# Patient Record
Sex: FEMALE | Race: WHITE | NOT HISPANIC OR LATINO | Employment: UNEMPLOYED | URBAN - METROPOLITAN AREA
[De-identification: names, ages, dates, MRNs, and addresses within clinical notes are randomized per-mention and may not be internally consistent; named-entity substitution may affect disease eponyms.]

---

## 2021-02-26 ENCOUNTER — HISTORICAL (OUTPATIENT)
Dept: BARIATRICS | Facility: HOSPITAL | Age: 71
End: 2021-02-26

## 2021-03-25 ENCOUNTER — HISTORICAL (OUTPATIENT)
Dept: BARIATRICS | Facility: HOSPITAL | Age: 71
End: 2021-03-25

## 2021-04-20 ENCOUNTER — HISTORICAL (OUTPATIENT)
Dept: BARIATRICS | Facility: HOSPITAL | Age: 71
End: 2021-04-20

## 2021-05-18 ENCOUNTER — HISTORICAL (OUTPATIENT)
Dept: BARIATRICS | Facility: HOSPITAL | Age: 71
End: 2021-05-18

## 2021-06-09 ENCOUNTER — HISTORICAL (OUTPATIENT)
Dept: ADMINISTRATIVE | Facility: HOSPITAL | Age: 71
End: 2021-06-09

## 2021-06-23 ENCOUNTER — HISTORICAL (OUTPATIENT)
Dept: ADMINISTRATIVE | Facility: HOSPITAL | Age: 71
End: 2021-06-23

## 2021-06-24 LAB — BCS RECOMMENDATION EXT: NORMAL

## 2021-07-27 ENCOUNTER — HISTORICAL (OUTPATIENT)
Dept: BARIATRICS | Facility: HOSPITAL | Age: 71
End: 2021-07-27

## 2021-08-31 ENCOUNTER — HISTORICAL (OUTPATIENT)
Dept: BARIATRICS | Facility: HOSPITAL | Age: 71
End: 2021-08-31

## 2021-09-22 ENCOUNTER — HISTORICAL (OUTPATIENT)
Dept: BARIATRICS | Facility: HOSPITAL | Age: 71
End: 2021-09-22

## 2021-11-12 ENCOUNTER — HISTORICAL (OUTPATIENT)
Dept: BARIATRICS | Facility: HOSPITAL | Age: 71
End: 2021-11-12

## 2022-01-06 ENCOUNTER — HISTORICAL (OUTPATIENT)
Dept: BARIATRICS | Facility: HOSPITAL | Age: 72
End: 2022-01-06

## 2022-03-07 ENCOUNTER — HISTORICAL (OUTPATIENT)
Dept: BARIATRICS | Facility: HOSPITAL | Age: 72
End: 2022-03-07

## 2022-04-30 NOTE — OP NOTE
DATE OF SURGERY:    06/09/2021    SURGEON:  Manish Wright MD    PROCEDURE PERFORMED:  Esophagogastroduodenoscopy and a gastric biopsy.    INDICATION FOR PROCEDURE:  This is a 71-year-old female who is considering having bariatric surgery.  She has occasional reflux.  She is undergoing a comprehensive workup for weight loss surgery.    PREOPERATIVE DIAGNOSES:    1. Morbid obesity.  2. Obstructive sleep apnea.  3. Osteoarthritis.  4. Depression.    POSTOPERATIVE DIAGNOSES:    1. Morbid obesity.  2. Obstructive sleep apnea.  3. Osteoarthritis.  4. Depression.    ANESTHESIA:  MAC.    SPECIMEN REMOVED:  Biopsy of gastric mass versus food bolus.    BLOOD LOSS:  There is no blood loss.    FINDINGS:  There was an 8 x 6 cm fleshy-appearing, very friable mass within the stomach that seemed to be a food bolus.  However, upon carefully examining this mass, which was in the dependent portion of the fundus, it was very friable.  The appearance of it did not look like an ordinary food bolus, but yet did not appear like a regular gastric tumor, either.  The surface was extremely friable, more consistent with food, however, did have the fleshy appearance of very friable gastric mucosa.  For this reason, I did biopsy it.  I did attempt to move it around.  However, the patient began coughing and gagging and we did place in the Trendelenburg and then reverse Trendelenburg position to see if this was a food bolus, it should move to the most dependent portion.  I did seem to move slightly.  However, it was mostly seemed to be located at the fundus of the stomach.  A biopsy was taken and if the biopsy is negative, I will take her back in a week or 2 to repeat the EGD to make sure that this has cleared.    DESCRIPTION OF PROCEDURE:  The patient was brought to the operating room and laid in the left lateral decubitus position.  The MAC was administered by the nurse anesthetist.  The enteroscope was advanced through her mouth, down  her pharynx, down the esophagus, and into the stomach.  Upon retroflexion, there was no hiatal hernia.  The duodenum appeared normal.  Gastric mucosa appeared normal other than the described findings above.  There was an 8 x 6 cm mass that was very difficult to distinguish between a very friable gastric tumor versus a food bolus and biopsies were taken and sent to Pathology.  The stomach was suctioned out.  The scope was withdrawn, brought to postanesthesia care unit in stable satisfactory condition.        ______________________________  Manish Wright MD RA/ALEXANDRA  DD:  06/09/2021  Time:  08:56AM  DT:  06/09/2021  Time:  09:12AM  Job #:  [id number]

## 2022-04-30 NOTE — OP NOTE
DATE OF SURGERY:    06/23/2021    SURGEON:  Manish Wright MD    PROCEDURE PERFORMED:  Esophagogastroduodenoscopy.    INDICATIONS:  This is a 71-year-old female who is being considered for a laparoscopic vertical sleeve gastrectomy for the treatment of her morbid obesity and gastroesophageal reflux disease.  On June 9, 2021, I performed an EGD in preparation for her sleeve gastrectomy.  However, the patient had what ultimately was determined to be a very large food bolus.  The bolus was not normal in appearance, and there was a possibility that there was actually shaggy gastric mucosa from a friable tumor.  Being unable to free this large food bolus from its base and determine with certainty that it was not a tumor of any kind, I took biopsies, which came back as a mixture of mucosal fragments and also vegetable matter.  I wanted to make sure that there was __________ any portion of tumor and have the patient come back with an empty stomach so that I could be certain that there was no intragastric pathology before proceeding with a sleeve gastrectomy.    PREOPERATIVE DIAGNOSES:    1. Morbid obesity.  2. Gastroesophageal reflux disease.  3. Hiatal hernia.    POSTOPERATIVE DIAGNOSES:    1. Morbid obesity.  2. Gastroesophageal reflux disease.  3. Hiatal hernia.    FINDINGS:  There was no intragastric tumor.  The stomach was essentially cleared with no vegetable matter or tumor.    SPECIMENS:  There were no specimens taken.    ESTIMATED BLOOD LOSS:  There was no blood loss.    DESCRIPTION OF PROCEDURE:  Patient was brought to the operating room, laid supine on the operating room table.  Monitored anesthesia was administered after a bite block was placed.  Scope was advanced through the patient's mouth.  The pharynx was briefly examined, as was the esophagus.  The esophagus was free of any masses, and its mucosa appeared normal.  The stomach was fully insufflated, and there was no tumor or intragastric mucosal  abnormalities.  The 1st and 2nd portion of the duodenum were examined as well, and there were no duodenal masses either.  Essentially, she appeared to have a normal EGD, with the exception of a 3-4 cm hiatal hernia.  Patient tolerated the procedure well.  The stomach was de-insufflated, and the scope was withdrawn, and patient was brought to the postanesthesia care unit in stable, satisfactory condition.        ______________________________  Manish Wright MD RA/SHAWN  DD:  06/23/2021  Time:  10:17AM  DT:  06/23/2021  Time:  10:38AM  Job #:  [id number]

## 2022-06-09 ENCOUNTER — TELEPHONE (OUTPATIENT)
Dept: BARIATRICS | Facility: HOSPITAL | Age: 72
End: 2022-06-09
Payer: MEDICARE

## 2022-06-09 NOTE — TELEPHONE ENCOUNTER
Called pt for 6, f/u visit. pt did not answer and does not have a voicemail box set up to leave a message

## 2022-06-09 NOTE — TELEPHONE ENCOUNTER
Patient was attempted to be reached for follow up via phone. However, it says, the voice mailbox has not been setup. Was unable to leave message. Philipas

## 2022-06-29 ENCOUNTER — ANESTHESIA EVENT (OUTPATIENT)
Dept: SURGERY | Facility: HOSPITAL | Age: 72
End: 2022-06-29
Payer: MEDICARE

## 2022-06-30 NOTE — DISCHARGE INSTRUCTIONS
BEFORE THE PROCEDURE:    REPORT ANY CHANGE IN YOUR PHYSICAL CONDITION TO YOUR DOCTOR IMMEDIATELY.  SELF ISOLATE AND CHECK TEMPERATURE DAILY, IF TEMP OVER 100, CALL PHYSICIAN IMMEDIATELY.   NO SMOKING OR ALCOHOL FOR 72 HOURS BEFORE YOUR PROCEDURE.   DO NOT EAT OR DRINK ANYTHING AFTER MIDNIGHT THE NIGHT BEFORE YOUR PROCEDURE    THE MORNING OF YOUR PROCEDURE:      YOU  WILL GET A PHONE CALL THE DAY BEFORE YOUR PROCEDURE WITH YOUR APPOINTED TIME   NO MAKE UP OR NAIL POLISH.   ALL MINORS MUST BE ACCOMPANIED BY AN ADULT AT ALL TIMES.     TAKE A SHOWER/BATH THE NIGHT BEFORE YOUR PROCEDURE.     TAKE BLOOD PRESSURE MEDICATIONS THE MORNING OF YOUR PROCEDURE, WITH SMALL SIPS WATER, AS DIRECTED BY YOUR PHYSICIAN.   DO NOT TAKE ANY DIABETIC MEDICATIONS UNLESS DIRECTED TO DO SO BY YOUR PHYSICIAN.   CONTACT LENSES AND DENTURES MUST BE REMOVED.  A RESPONSIBLE ADULT MUST ACCOMPANY YOU HOME UPON DISCHARGE.   ONLY 1 VISITORS ALLOWED PER ROOM.   BRING YOUR EYE DROPS      COVID 19: RETURN TO FIRST FLOOR REGISTRATION ON Monday July 4, 2022      YOUR THOUGHTS AND OPINIONS HELP US TO BETTER SERVE YOU.     PLEASE PARTICIPATE IN SURVEYS ABOUT YOUR CARE.     THANK YOU FOR CHOOSING [facility].

## 2022-07-01 ENCOUNTER — HOSPITAL ENCOUNTER (OUTPATIENT)
Dept: PREADMISSION TESTING | Facility: HOSPITAL | Age: 72
Discharge: HOME | End: 2022-07-01
Attending: OPHTHALMOLOGY
Payer: MEDICARE

## 2022-07-01 DIAGNOSIS — Z01.818 PRE-OP TESTING: ICD-10-CM

## 2022-07-01 PROBLEM — H25.11 AGE-RELATED NUCLEAR CATARACT, RIGHT EYE: Status: ACTIVE | Noted: 2022-07-01

## 2022-07-01 RX ORDER — ERGOCALCIFEROL 1.25 MG/1
50000 CAPSULE ORAL
COMMUNITY

## 2022-07-01 RX ORDER — AMITRIPTYLINE HYDROCHLORIDE 100 MG/1
100 TABLET ORAL NIGHTLY
COMMUNITY

## 2022-07-01 RX ORDER — CLONAZEPAM 0.5 MG/1
0.5 TABLET ORAL NIGHTLY
COMMUNITY

## 2022-07-01 RX ORDER — SUMATRIPTAN 50 MG/1
50 TABLET, FILM COATED ORAL DAILY PRN
COMMUNITY

## 2022-07-01 RX ORDER — MULTIVIT WITH MINERALS/HERBS
1 TABLET ORAL DAILY
COMMUNITY

## 2022-07-01 RX ORDER — ATENOLOL 50 MG/1
50 TABLET ORAL DAILY
COMMUNITY

## 2022-07-01 RX ORDER — ATORVASTATIN CALCIUM 20 MG/1
20 TABLET, FILM COATED ORAL DAILY
COMMUNITY

## 2022-07-01 RX ORDER — TRAZODONE HYDROCHLORIDE 100 MG/1
100 TABLET ORAL NIGHTLY
COMMUNITY

## 2022-07-01 RX ORDER — VITAMIN E 268 MG
400 CAPSULE ORAL DAILY
COMMUNITY

## 2022-07-01 RX ORDER — AMLODIPINE BESYLATE 10 MG/1
10 TABLET ORAL DAILY
COMMUNITY

## 2022-07-01 RX ORDER — CHOLECALCIFEROL (VITAMIN D3) 25 MCG
1000 TABLET ORAL DAILY
COMMUNITY

## 2022-07-01 RX ORDER — UBIDECARENONE 75 MG
1000 CAPSULE ORAL DAILY
COMMUNITY

## 2022-07-05 ENCOUNTER — HOSPITAL ENCOUNTER (OUTPATIENT)
Facility: HOSPITAL | Age: 72
Discharge: HOME | End: 2022-07-05
Attending: OPHTHALMOLOGY | Admitting: OPHTHALMOLOGY
Payer: MEDICARE

## 2022-07-05 ENCOUNTER — ANESTHESIA (OUTPATIENT)
Dept: SURGERY | Facility: HOSPITAL | Age: 72
End: 2022-07-05
Payer: MEDICARE

## 2022-07-05 DIAGNOSIS — H25.11 AGE-RELATED NUCLEAR CATARACT, RIGHT EYE: ICD-10-CM

## 2022-07-05 PROCEDURE — 36000707: Performed by: OPHTHALMOLOGY

## 2022-07-05 PROCEDURE — 36000706: Performed by: OPHTHALMOLOGY

## 2022-07-05 PROCEDURE — 25000003 PHARM REV CODE 250: Performed by: OPHTHALMOLOGY

## 2022-07-05 PROCEDURE — 37000009 HC ANESTHESIA EA ADD 15 MINS: Performed by: OPHTHALMOLOGY

## 2022-07-05 PROCEDURE — 63600175 PHARM REV CODE 636 W HCPCS: Performed by: OPHTHALMOLOGY

## 2022-07-05 PROCEDURE — 71000015 HC POSTOP RECOV 1ST HR: Performed by: OPHTHALMOLOGY

## 2022-07-05 PROCEDURE — V2632 POST CHMBR INTRAOCULAR LENS: HCPCS | Performed by: OPHTHALMOLOGY

## 2022-07-05 PROCEDURE — 25000242 PHARM REV CODE 250 ALT 637 W/ HCPCS: Performed by: ANESTHESIOLOGY

## 2022-07-05 PROCEDURE — 37000008 HC ANESTHESIA 1ST 15 MINUTES: Performed by: OPHTHALMOLOGY

## 2022-07-05 PROCEDURE — 63600175 PHARM REV CODE 636 W HCPCS: Performed by: ANESTHESIOLOGY

## 2022-07-05 DEVICE — IMPLANTABLE DEVICE: Type: IMPLANTABLE DEVICE | Site: EYE | Status: FUNCTIONAL

## 2022-07-05 RX ORDER — LIDOCAINE HYDROCHLORIDE 10 MG/ML
0.5 INJECTION, SOLUTION EPIDURAL; INFILTRATION; INTRACAUDAL; PERINEURAL ONCE
Status: DISCONTINUED | OUTPATIENT
Start: 2022-07-05 | End: 2022-07-05 | Stop reason: HOSPADM

## 2022-07-05 RX ORDER — PROPARACAINE HYDROCHLORIDE 5 MG/ML
1 SOLUTION/ DROPS OPHTHALMIC
Status: COMPLETED | OUTPATIENT
Start: 2022-07-05 | End: 2022-07-05

## 2022-07-05 RX ORDER — BROMFENAC SODIUM 0.81 MG/ML
SOLUTION/ DROPS OPHTHALMIC
Status: DISCONTINUED | OUTPATIENT
Start: 2022-07-05 | End: 2022-07-05 | Stop reason: HOSPADM

## 2022-07-05 RX ORDER — TROPICAMIDE 10 MG/ML
1 SOLUTION/ DROPS OPHTHALMIC
Status: COMPLETED | OUTPATIENT
Start: 2022-07-05 | End: 2022-07-05

## 2022-07-05 RX ORDER — LIDOCAINE HYDROCHLORIDE 10 MG/ML
1 INJECTION, SOLUTION EPIDURAL; INFILTRATION; INTRACAUDAL; PERINEURAL ONCE
Status: DISCONTINUED | OUTPATIENT
Start: 2022-07-05 | End: 2022-07-05 | Stop reason: HOSPADM

## 2022-07-05 RX ORDER — PHENYLEPHRINE HYDROCHLORIDE 25 MG/ML
1 SOLUTION/ DROPS OPHTHALMIC
Status: COMPLETED | OUTPATIENT
Start: 2022-07-05 | End: 2022-07-05

## 2022-07-05 RX ORDER — EPINEPHRINE 1 MG/ML
INJECTION, SOLUTION INTRACARDIAC; INTRAMUSCULAR; INTRAVENOUS; SUBCUTANEOUS
Status: DISCONTINUED | OUTPATIENT
Start: 2022-07-05 | End: 2022-07-05 | Stop reason: HOSPADM

## 2022-07-05 RX ORDER — PROPARACAINE HYDROCHLORIDE 5 MG/ML
SOLUTION/ DROPS OPHTHALMIC
Status: DISCONTINUED | OUTPATIENT
Start: 2022-07-05 | End: 2022-07-05 | Stop reason: HOSPADM

## 2022-07-05 RX ORDER — MOXIFLOXACIN 5 MG/ML
1 SOLUTION/ DROPS OPHTHALMIC
Status: COMPLETED | OUTPATIENT
Start: 2022-07-05 | End: 2022-07-05

## 2022-07-05 RX ORDER — MOXIFLOXACIN 5 MG/ML
SOLUTION/ DROPS OPHTHALMIC
Status: DISCONTINUED | OUTPATIENT
Start: 2022-07-05 | End: 2022-07-05 | Stop reason: HOSPADM

## 2022-07-05 RX ORDER — MIDAZOLAM HCL 2 MG/ML
4 SYRUP ORAL ONCE
Status: COMPLETED | OUTPATIENT
Start: 2022-07-05 | End: 2022-07-05

## 2022-07-05 RX ORDER — LIDOCAINE HYDROCHLORIDE 10 MG/ML
INJECTION, SOLUTION EPIDURAL; INFILTRATION; INTRACAUDAL; PERINEURAL
Status: DISCONTINUED | OUTPATIENT
Start: 2022-07-05 | End: 2022-07-05 | Stop reason: HOSPADM

## 2022-07-05 RX ORDER — PREDNISOLONE ACETATE 10 MG/ML
SUSPENSION/ DROPS OPHTHALMIC
Status: DISCONTINUED | OUTPATIENT
Start: 2022-07-05 | End: 2022-07-05 | Stop reason: HOSPADM

## 2022-07-05 RX ORDER — MIDAZOLAM HYDROCHLORIDE 1 MG/ML
INJECTION INTRAMUSCULAR; INTRAVENOUS
Status: DISCONTINUED | OUTPATIENT
Start: 2022-07-05 | End: 2022-07-05

## 2022-07-05 RX ADMIN — MOXIFLOXACIN 1 DROP: 5 SOLUTION/ DROPS OPHTHALMIC at 11:07

## 2022-07-05 RX ADMIN — PROPARACAINE HYDROCHLORIDE 1 DROP: 5 SOLUTION/ DROPS OPHTHALMIC at 11:07

## 2022-07-05 RX ADMIN — PHENYLEPHRINE HYDROCHLORIDE 1 DROP: 25 SOLUTION/ DROPS OPHTHALMIC at 11:07

## 2022-07-05 RX ADMIN — TROPICAMIDE 1 DROP: 10 SOLUTION/ DROPS OPHTHALMIC at 11:07

## 2022-07-05 RX ADMIN — MIDAZOLAM HYDROCHLORIDE 4 MG: 2 SYRUP ORAL at 11:07

## 2022-07-05 RX ADMIN — MIDAZOLAM 2 MG: 1 INJECTION INTRAMUSCULAR; INTRAVENOUS at 11:07

## 2022-07-05 NOTE — DISCHARGE SUMMARY
[facility]  Brief Discharge Note    Patient Name:  Lisa Hilliard   MRN:  [id number]    :  1950   Admission Date:  2022    Discharge Date:  2022   Attending Physician: Amarjit Valentine MD     Procedure:  PHACOEMULSIFICATION, CATARACT (Right)    OUTCOME: Patient tolerated procedure well without complication and is now ready for discharge.    DISPOSITION: Home or Self Care    FINAL DIAGNOSIS:  Age-related nuclear cataract, right eye    FOLLOWUP: 1 day in clinic    DISCHARGE INSTRUCTIONS:  Wear eye shield until your schedule appointment with doctor.                                                       Only remove eye shield to apply eye drops.                                                       Follow the post-op eye instructions given from the clinic.

## 2022-07-05 NOTE — INTERVAL H&P NOTE
The patient has been examined and the H&P has been reviewed:    I concur with the findings and no changes have occurred since H&P was written.    Surgery risks, benefits and alternative options discussed and understood by patient/family.          Active Hospital Problems    Diagnosis  POA    *Age-related nuclear cataract, right eye [H25.11]  Yes      Resolved Hospital Problems   No resolved problems to display.

## 2022-07-05 NOTE — ANESTHESIA PREPROCEDURE EVALUATION
07/05/2022  Lisa Hilliard is a 72 y.o., female.      Pre-op Assessment    I have reviewed the Patient Summary Reports.    I have reviewed the NPO Status.   I have reviewed the Medications.     Review of Systems  Anesthesia Hx:  No problems with previous Anesthesia  Denies Family Hx of Anesthesia complications.   Denies Personal Hx of Anesthesia complications.   Social:  Non-Smoker    Hematology/Oncology:         -- Cancer in past history: Breast surgery    Cardiovascular:   Hypertension, well controlled    Pulmonary:  Pulmonary Normal    Renal/:  Renal/ Normal     Hepatic/GI:  Hepatic/GI Normal    Neurological:  Neurology Normal    Endocrine:  Endocrine Normal        Physical Exam  General: Well nourished    Airway:  Mallampati: II / II  Mouth Opening: Normal  TM Distance: Normal  Tongue: Normal  Neck ROM: Normal ROM    Dental:  Intact    Chest/Lungs:  Clear to auscultation    Heart:  Rate: Normal  Rhythm: Regular Rhythm  Sounds: Normal        Anesthesia Plan  Type of Anesthesia, risks & benefits discussed:    Anesthesia Type: MAC  Intra-op Monitoring Plan: Standard ASA Monitors  Post Op Pain Control Plan: multimodal analgesia  Induction:  IV  Airway Plan: Direct  Informed Consent: Informed consent signed with the Patient and all parties understand the risks and agree with anesthesia plan.  All questions answered.   ASA Score: 2  Day of Surgery Review of History & Physical: I have interviewed and examined the patient. I have reviewed the patient's H&P dated: There are no significant changes.     Ready For Surgery From Anesthesia Perspective.     .

## 2022-07-05 NOTE — DISCHARGE INSTRUCTIONS
-RELAX AT HOME FOR THE REST OF THE DAY. YOU MAY EAT ANYTHING YOU LIKE.   -PLAN TO SEE DR HANNA TOMORROW AT THE OFFICE. BRING ALL EYE DROPS WITH YOU TO THIS APPOINTMENT.    -PUT EYE DROPS IN THE AFFECTED EYE AS PER DR HANNA'S EYE DROP SCHEDULED. -USE IN ANY ORDER, WAIT 5 MINUTES BETWEEN DROPS.  -REMOVE EYE SHIELD WHILE PUTTING EYE DROPS IN.    -DO NOT RUB YOUR EYE!!  -DO NOT EXERT YOURSELF - NO HEAVY LIFTING, RUNNING OR SWIMMING FOR 1 WEEK.  -YOU MAY SHOWER, BUT DON'T ALLOW WATER INTO YOUR EYE FOR 1 WEEK.  -WEAR PROTECTIVE SUNGLASSES DURING THE DAY AND AN EYE SHIELD AT NIGHT FOR 1 WEEK.    NO DRINKING ALCOHOL OR DRIVING FOR 24 HOURS.    -IF YOU HAVE PAIN, REDNESS OR DECREASED VISION, CALL DR HANNA'S OFFICE [phone] OR IF AFTER HOURS CALL [phone].

## 2022-07-05 NOTE — ANESTHESIA POSTPROCEDURE EVALUATION
Anesthesia Post Evaluation    Patient: Lisa Hilliard    Procedure(s) Performed: Procedure(s) (LRB):  PHACOEMULSIFICATION, CATARACT, WITH IOL INSERTION (Right)    Final Anesthesia Type: MAC      Patient location during evaluation: OPS  Patient participation: Yes- Able to Participate  Level of consciousness: awake  Post-procedure vital signs: reviewed and stable  Pain management: adequate  Airway patency: patent    PONV status at discharge: No PONV  Anesthetic complications: no      Cardiovascular status: blood pressure returned to baseline  Respiratory status: spontaneous ventilation  Hydration status: euvolemic  Follow-up not needed.          Vitals Value Taken Time   /77 07/05/22 1229   Temp 36.7 °C (98.1 °F) 07/05/22 1229   Pulse 59 07/05/22 1229   Resp 18 07/05/22 1229   SpO2 99 % 07/05/22 1229         No case tracking events are documented in the log.      Pain/Vale Score: Vale Score: 10 (7/5/2022 12:29 PM)

## 2022-07-05 NOTE — OP NOTE
[facility]  Operative Note    Date:  2022     Patient:  Lisa Hilliard   MRN:  [id number]   :  1950    Surgeon:  Amarjit Valentine MD    PREOPERATIVE DIAGNOSIS:  Visually significant age-related nuclear cataract, right eye.    POSTOPERATIVE DIAGNOSIS:  Visually significant age-related nuclear cataract, right eye.    PROCEDURE:  Phacoemulsification of cataract with posterior chamber intraocular lens implant, right eye.    ANESTHESIA:  Topical with MAC.      COMPLICATIONS:  None.    ESTIMATED BLOOD LOSS:  Minimal.    PROCEDURE IN DETAIL:  The patient presented to our clinic complaining of increasing difficulties with activities of daily living due to a visually significant cataract in the right eye.  After risks, benefits, and alternatives were explained to the patient, the patient agreed to proceed with the above procedure.  The patient was brought to the operating room and received topical anesthetic to the right eye and was then prepped and draped in the usual sterile fashion.  The right eye was first entered at 11:00 o'clock paracentesis site followed by intracameral lidocaine, and Viscoat.  The primary surgical site was then created at 8:30 o'clock followed by continuous capsulotomy, hydrodissection, and phacoemulsification of the cataract.  Residual cortical material was removed using the automated irrigation-aspiration technique.  Provisc was injected into the posterior chamber and an YARA model ZCB00 17.0 diopter lens was placed in the bag without difficulty.  Residual Provisc was removed using the automated irrigation-aspiration technique.  The eye was re-pressurized using BSS solution, and both the paracentesis and primary surgical site were demonstrated to be watertight at the end of the case with Weck-Beata manipulation.  Vigamox, Pred Forte, and Prolensa were placed in the eye followed by placement of a Holder shield.  The patient tolerated the procedure well and was taken to the recovery  room in good and stable condition.  The patient was instructed to refrain from any heavy lifting, bending, stooping, or straining activities and to follow-up in the morning for routine post-operative care with Dr. Amarjit Valentine.

## 2022-07-05 NOTE — TRANSFER OF CARE
Anesthesia Transfer of Care Note    Patient: Lisa Hilliard    Procedure(s) Performed: Procedure(s) (LRB):  PHACOEMULSIFICATION, CATARACT, WITH IOL INSERTION (Right)    Patient location: Other: OR C    Anesthesia Type: MAC    Transport from OR: Transported from OR on room air with adequate spontaneous ventilation    Post pain: adequate analgesia    Post assessment: no apparent anesthetic complications    Post vital signs: stable    Level of consciousness: awake    Nausea/Vomiting: no nausea/vomiting    Complications: none    Transfer of care protocol was followed      Last vitals:   HR 55  /65  RR 16  T 36.7  SPO2 98

## 2022-07-08 VITALS
TEMPERATURE: 98 F | OXYGEN SATURATION: 99 % | DIASTOLIC BLOOD PRESSURE: 77 MMHG | RESPIRATION RATE: 18 BRPM | SYSTOLIC BLOOD PRESSURE: 144 MMHG | HEART RATE: 59 BPM

## 2022-07-08 PROBLEM — C50.411 MALIGNANT NEOPLASM OF UPPER-OUTER QUADRANT OF RIGHT BREAST IN FEMALE, ESTROGEN RECEPTOR POSITIVE: Status: ACTIVE | Noted: 2022-07-08

## 2022-07-08 PROBLEM — Z17.0 MALIGNANT NEOPLASM OF UPPER-OUTER QUADRANT OF RIGHT BREAST IN FEMALE, ESTROGEN RECEPTOR POSITIVE: Status: ACTIVE | Noted: 2022-07-08

## 2022-07-08 NOTE — PROGRESS NOTES
Subjective:       Patient ID: Lisa Hilliard is a 72 y.o. female.    Chief Complaint: Breast Cancer      History of Present Illness  PCP: Dr. Juma Lama- in [place], [place]  Psych: Dr. Rico Gonzalez, [place]     Problem List: Right Breast Cancer-RUQ IDC, [id number]--stage IA    Path:  Infiltrating ductal ca 0.7 cm. ER 98%, MT 91%, HER2 negative by IHC, HER 2 negtive by FISH, Ki 67--20%, Margin positive--lateral    Current Treatment: observation    Treatment History:   Radiation to right breast in [place]: 4800 cGy from 1/8/14 to 2/21/14 then 1000 cGy boost from 2/25/14 to 3/6/14.  Anastrazole 1 mg daily initiated 2/2014--Discontinued 08/03/17 s/t muscle cramps   Femara 2.5mg daily (initiated 09/07/17 stopped 4/4/19)    Onc History:  Right needle loc lumpectomy 11/13/2013 -Dr. Busby  Right re-excision 11/25/2013--NO residual cancer 0 of 4 nodes    HPI/Clinical History:  73 y/o F w/ PMHx of hypothyroidism, vit D deficiency, insomnia, depression, anxiety, HLD, OA and HTN follows in Oncology clinic for stage IA HR+ UQ IDC of right breast. She was found to have an abnormal mammogram and a biopsy was performed. Once the results return she was scheduled for a lumpectomy and sentinel node examination. Radiation was completed in [place] (3/6/14). After radiation was completed, anastrozole was initiated 2/2014. She took anastrozole for about 3 years, but had to discontinue it due to c/o muscle cramps. She was then started on Femara, which she is currently still taking.    Today 7/11/22: Patient seen today for breast cancer follow-up. She reports chronic lower back pain that has improved from prior. She had gastric sleeve in September and states she has successfully lost 50 lbs so far. She denies any new lumps or masses in her breasts and continues to perform monthly self breast exams.  Denies SOB, HA, new bony pains, changes in bowels, fevers, chills or recent infections/illneses. She is UTD with MMG screening. No  other complaints or concerns reported.    IMAGING:  10/2/17 DEXA Femur normal, L-spine borderline osteopenia at -1  18 MMG b/l benign  11/15/19 DEXA normal  21 MMG benign  22 MMG benign    Labs:  10/1/18 Vit D 24.6 Cr 0.8 TSH 7.45 AlkPhos 103  19 Vit D 25, WBC 10.7, glucose 144  20 labs WNL  21 CBC and CMP unremarkable  22 Cr 0.7, AlkPhos 127, WBC 9.6, Hgb 13.7,     PMHx: HTN, insomnia, anxiety, depression, vit D def, HLD, OA, breast cancer, hypothyroidism  PSHx: Tonsillectomy, cholecystectomy, ankle fracture  Social Hx:Single, no children. Lives in [place].  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 Lisa was in a long-term relationship with a lady from [facility]. She cared for this lady for many years and they were together 30 years. This lady  under her care from cancer. She has no relatives near by and is uncertain what she would do at the end of her life.  Family Hx: Mother  of MI--age70s  Father--biological - of cirrhosis  Maternal aunt  of MI  Brother had colon cancer , CAD        Review of Systems  CONSTITUTIONAL: no fevers, no chills, + weight loss (s/p gastric sleeve), no fatigue, no weakness  HEMATOLOGIC: no abnormal bleeding, no abnormal bruising, no drenching night sweats  ONCOLOGIC: no new masses or lumps  HEENT: no vision loss, no tinnitus or hearing loss, no nose bleeding, no dysphagia, no odynophagia  CVS: no chest pain, no palpitations, no dyspnea on exertion  RESP: no shortness of breath, no hemoptysis, no cough  BREAST: no nipple discharge, no breast tenderness, no breast masses on self breast examination  GI: no nausea, no vomiting, no diarrhea, no constipation, no melena, no hematochezia, no hematemesis, no abdominal pain, no increase in abdominal girth  : no dysuria, no hematuria, no discharge  GYN: no abnormal vaginal bleeding, no dyspareunia, no vaginal discharge  INTEGUMENT: no rashes, no abnormal bruising, no nail pitting, no  hyperpigmentation  NEURO: no falls, no memory loss, no paresthesias or dysesthesias, no urofecal incontinence or retention, no loss of strength on any extremity  MSK: + chronic back pain, no new joint pain, no joint swelling  PSYCH: no suicidal or homicidal ideation, no depression, no insomnia, no anhedonia  ENDOCRINE: no heat or cold intolerance, no polyuria, no polydipsia        Objective:      Physical Exam  Vitals:    07/11/22 1419   BP: 118/72   Pulse: 67   Resp: 16   Temp: 97.7 °F (36.5 °C)        ECOG PS 1  GA: AAOx3, NAD  HEENT: NCAT, PERRLA, EOMI, fair dentition, no oral ulcers  LYMPH: no cervical, axillary or supraclavicular adenopathy  CVS: s1s2 RRR, no M/R/G  RESP: CTA b/l, no crackles, no wheezes or rhonchi  Breast: Left: no masses, lumps, no nipple inversion, no peau d'orange, Right: no masses, lumps, no nipple inversion, no peau d'orange, overall smaller than left, much denser, scar in axillary tail with some surrounding red spots <1mm in size that pt states have been present since RT.   ABD: soft, NT, ND, BS+, no hepatosplenomegaly  EXT: no deformities, no pedal edema  SKIN: red rash noted in skin fold left breast, no bruises or purpura, warm and dry, multiple seborrheic dermatosis on left breast and back  NEURO: normal mentation, strength 5/5 on all 4 extremities, no sensory deficits    Assessment:       Problem List Items Addressed This Visit        Oncology    Malignant neoplasm of upper-outer quadrant of right breast in female, estrogen receptor positive - Primary    Relevant Orders    Mammo Digital Screening Bilat      Other Visit Diagnoses     Osteopenia, unspecified location        Morbid obesity        Relevant Medications    nystatin (MYCOSTATIN) powder    Encounter for screening mammogram for malignant neoplasm of breast         Relevant Orders    Mammo Digital Screening Bilat            Plan:       1. Malignant neoplasm of upper-outer quadrant of right female breast C50.411   RUQ IDC T1b  stage IA, s/p lumpectomy initially with positive margins then with revision, then adjuvant EBRT and then adjuvant endocrine therapy (started 2/2014). Initially took anastrozole for ~3 years, then Femara, stopped 4/2019  MMG 6/2022, benign, repeat due in 1 year, ordered today  Nystatin powder to breast folds PRN redness/rash sent today  Return to clinic in 1 year with MMG results. No labs needed as she has labs q6 months with PCP  Call if any questions or concerns     2. Osteopenia M85.80   Improved with prolia and now within normal limits. She is now off AI so no need for continuation of prolia  Last prolia 10/2019     3. Morbid obesity E66.01   S/p gastric sleeve in 9/2022, has lost 50 lbs thus far        ELIZABETH Fatima

## 2022-07-11 ENCOUNTER — OFFICE VISIT (OUTPATIENT)
Dept: HEMATOLOGY/ONCOLOGY | Facility: CLINIC | Age: 72
End: 2022-07-11
Payer: MEDICARE

## 2022-07-11 VITALS
HEART RATE: 67 BPM | TEMPERATURE: 98 F | DIASTOLIC BLOOD PRESSURE: 72 MMHG | BODY MASS INDEX: 35.41 KG/M2 | WEIGHT: 207.38 LBS | HEIGHT: 64 IN | OXYGEN SATURATION: 98 % | RESPIRATION RATE: 16 BRPM | SYSTOLIC BLOOD PRESSURE: 118 MMHG

## 2022-07-11 DIAGNOSIS — M85.80 OSTEOPENIA, UNSPECIFIED LOCATION: ICD-10-CM

## 2022-07-11 DIAGNOSIS — C50.411 MALIGNANT NEOPLASM OF UPPER-OUTER QUADRANT OF RIGHT BREAST IN FEMALE, ESTROGEN RECEPTOR POSITIVE: Primary | ICD-10-CM

## 2022-07-11 DIAGNOSIS — E66.01 MORBID OBESITY: ICD-10-CM

## 2022-07-11 DIAGNOSIS — Z12.31 ENCOUNTER FOR SCREENING MAMMOGRAM FOR MALIGNANT NEOPLASM OF BREAST: ICD-10-CM

## 2022-07-11 DIAGNOSIS — Z17.0 MALIGNANT NEOPLASM OF UPPER-OUTER QUADRANT OF RIGHT BREAST IN FEMALE, ESTROGEN RECEPTOR POSITIVE: Primary | ICD-10-CM

## 2022-07-11 PROCEDURE — 99213 PR OFFICE/OUTPT VISIT, EST, LEVL III, 20-29 MIN: ICD-10-PCS | Mod: ,,, | Performed by: NURSE PRACTITIONER

## 2022-07-11 PROCEDURE — 99213 OFFICE O/P EST LOW 20 MIN: CPT | Mod: ,,, | Performed by: NURSE PRACTITIONER

## 2022-07-11 RX ORDER — NYSTATIN 100000 [USP'U]/G
POWDER TOPICAL
Qty: 30 G | Refills: 2 | Status: SHIPPED | OUTPATIENT
Start: 2022-07-11 | End: 2023-07-11

## 2022-07-13 DIAGNOSIS — Z12.31 SCREENING MAMMOGRAM FOR BREAST CANCER: ICD-10-CM

## 2022-07-13 DIAGNOSIS — Z12.31 ENCOUNTER FOR SCREENING MAMMOGRAM FOR BREAST CANCER: Primary | ICD-10-CM

## 2022-07-29 ENCOUNTER — ANESTHESIA EVENT (OUTPATIENT)
Dept: SURGERY | Facility: HOSPITAL | Age: 72
End: 2022-07-29
Payer: MEDICARE

## 2022-07-31 PROBLEM — H25.12 AGE-RELATED NUCLEAR CATARACT, LEFT EYE: Status: ACTIVE | Noted: 2022-07-31

## 2022-08-01 ENCOUNTER — HOSPITAL ENCOUNTER (OUTPATIENT)
Dept: PREADMISSION TESTING | Facility: HOSPITAL | Age: 72
Discharge: HOME | End: 2022-08-01
Attending: OPHTHALMOLOGY
Payer: MEDICARE

## 2022-08-01 VITALS — BODY MASS INDEX: 35.85 KG/M2 | WEIGHT: 210 LBS | HEIGHT: 64 IN

## 2022-08-02 ENCOUNTER — ANESTHESIA (OUTPATIENT)
Dept: SURGERY | Facility: HOSPITAL | Age: 72
End: 2022-08-02
Payer: MEDICARE

## 2022-08-02 ENCOUNTER — HOSPITAL ENCOUNTER (OUTPATIENT)
Facility: HOSPITAL | Age: 72
Discharge: HOME | End: 2022-08-02
Attending: OPHTHALMOLOGY | Admitting: OPHTHALMOLOGY
Payer: MEDICARE

## 2022-08-02 VITALS
RESPIRATION RATE: 18 BRPM | TEMPERATURE: 98 F | DIASTOLIC BLOOD PRESSURE: 81 MMHG | HEART RATE: 56 BPM | OXYGEN SATURATION: 96 % | SYSTOLIC BLOOD PRESSURE: 156 MMHG

## 2022-08-02 DIAGNOSIS — H25.12 AGE-RELATED NUCLEAR CATARACT, LEFT EYE: ICD-10-CM

## 2022-08-02 PROCEDURE — 37000009 HC ANESTHESIA EA ADD 15 MINS: Performed by: OPHTHALMOLOGY

## 2022-08-02 PROCEDURE — 25000242 PHARM REV CODE 250 ALT 637 W/ HCPCS: Performed by: ANESTHESIOLOGY

## 2022-08-02 PROCEDURE — 25000003 PHARM REV CODE 250: Performed by: OPHTHALMOLOGY

## 2022-08-02 PROCEDURE — 71000015 HC POSTOP RECOV 1ST HR: Performed by: OPHTHALMOLOGY

## 2022-08-02 PROCEDURE — 37000008 HC ANESTHESIA 1ST 15 MINUTES: Performed by: OPHTHALMOLOGY

## 2022-08-02 PROCEDURE — V2632 POST CHMBR INTRAOCULAR LENS: HCPCS | Performed by: OPHTHALMOLOGY

## 2022-08-02 PROCEDURE — 36000706: Performed by: OPHTHALMOLOGY

## 2022-08-02 PROCEDURE — 36000707: Performed by: OPHTHALMOLOGY

## 2022-08-02 PROCEDURE — 63600175 PHARM REV CODE 636 W HCPCS: Performed by: NURSE ANESTHETIST, CERTIFIED REGISTERED

## 2022-08-02 PROCEDURE — 63600175 PHARM REV CODE 636 W HCPCS: Performed by: OPHTHALMOLOGY

## 2022-08-02 DEVICE — TECNIS 1-PC CLEAR MONO 6.0MM 15.5D
Type: IMPLANTABLE DEVICE | Site: EYE | Status: FUNCTIONAL
Brand: TECNIS IOL

## 2022-08-02 RX ORDER — BROMFENAC SODIUM 0.81 MG/ML
SOLUTION/ DROPS OPHTHALMIC
Status: DISCONTINUED | OUTPATIENT
Start: 2022-08-02 | End: 2022-08-02 | Stop reason: HOSPADM

## 2022-08-02 RX ORDER — EPINEPHRINE 1 MG/ML
INJECTION, SOLUTION INTRACARDIAC; INTRAMUSCULAR; INTRAVENOUS; SUBCUTANEOUS
Status: DISCONTINUED | OUTPATIENT
Start: 2022-08-02 | End: 2022-08-02 | Stop reason: HOSPADM

## 2022-08-02 RX ORDER — MIDAZOLAM HYDROCHLORIDE 1 MG/ML
INJECTION INTRAMUSCULAR; INTRAVENOUS
Status: DISCONTINUED | OUTPATIENT
Start: 2022-08-02 | End: 2022-08-02

## 2022-08-02 RX ORDER — MOXIFLOXACIN 5 MG/ML
1 SOLUTION/ DROPS OPHTHALMIC
Status: ACTIVE | OUTPATIENT
Start: 2022-08-02 | End: 2022-08-02

## 2022-08-02 RX ORDER — LIDOCAINE HYDROCHLORIDE 10 MG/ML
1 INJECTION, SOLUTION EPIDURAL; INFILTRATION; INTRACAUDAL; PERINEURAL ONCE
Status: ACTIVE | OUTPATIENT
Start: 2022-08-02

## 2022-08-02 RX ORDER — MIDAZOLAM HCL 2 MG/ML
4 SYRUP ORAL ONCE
Status: COMPLETED | OUTPATIENT
Start: 2022-08-02 | End: 2022-08-02

## 2022-08-02 RX ORDER — LIDOCAINE HYDROCHLORIDE 10 MG/ML
0.5 INJECTION, SOLUTION EPIDURAL; INFILTRATION; INTRACAUDAL; PERINEURAL ONCE
Status: ACTIVE | OUTPATIENT
Start: 2022-08-02

## 2022-08-02 RX ORDER — PREDNISOLONE ACETATE 10 MG/ML
SUSPENSION/ DROPS OPHTHALMIC
Status: DISCONTINUED | OUTPATIENT
Start: 2022-08-02 | End: 2022-08-02 | Stop reason: HOSPADM

## 2022-08-02 RX ORDER — MOXIFLOXACIN 5 MG/ML
SOLUTION/ DROPS OPHTHALMIC
Status: DISCONTINUED | OUTPATIENT
Start: 2022-08-02 | End: 2022-08-02 | Stop reason: HOSPADM

## 2022-08-02 RX ORDER — TROPICAMIDE 10 MG/ML
1 SOLUTION/ DROPS OPHTHALMIC
Status: COMPLETED | OUTPATIENT
Start: 2022-08-02 | End: 2022-08-02

## 2022-08-02 RX ORDER — PROPARACAINE HYDROCHLORIDE 5 MG/ML
1 SOLUTION/ DROPS OPHTHALMIC
Status: COMPLETED | OUTPATIENT
Start: 2022-08-02 | End: 2022-08-02

## 2022-08-02 RX ORDER — PROPARACAINE HYDROCHLORIDE 5 MG/ML
SOLUTION/ DROPS OPHTHALMIC
Status: DISCONTINUED | OUTPATIENT
Start: 2022-08-02 | End: 2022-08-02 | Stop reason: HOSPADM

## 2022-08-02 RX ORDER — PHENYLEPHRINE HYDROCHLORIDE 25 MG/ML
1 SOLUTION/ DROPS OPHTHALMIC
Status: COMPLETED | OUTPATIENT
Start: 2022-08-02 | End: 2022-08-02

## 2022-08-02 RX ADMIN — TROPICAMIDE 1 DROP: 10 SOLUTION/ DROPS OPHTHALMIC at 09:08

## 2022-08-02 RX ADMIN — PHENYLEPHRINE HYDROCHLORIDE 1 DROP: 25 SOLUTION/ DROPS OPHTHALMIC at 09:08

## 2022-08-02 RX ADMIN — PROPARACAINE HYDROCHLORIDE 1 DROP: 5 SOLUTION/ DROPS OPHTHALMIC at 09:08

## 2022-08-02 RX ADMIN — MIDAZOLAM 2 MG: 1 INJECTION INTRAMUSCULAR; INTRAVENOUS at 11:08

## 2022-08-02 RX ADMIN — MIDAZOLAM HYDROCHLORIDE 4 MG: 2 SYRUP ORAL at 10:08

## 2022-08-02 NOTE — PLAN OF CARE
Plan of care note reviewed with patient, patient and sister in law verbalize understanding and agree.

## 2022-08-02 NOTE — TRANSFER OF CARE
Anesthesia Transfer of Care Note    Patient: Lisa Hilliard    Procedure(s) Performed: Procedure(s) (LRB):  PHACOEMULSIFICATION, CATARACT, WITH IOL INSERTION (Left)    Patient location: Other: OR C    Anesthesia Type: MAC    Transport from OR: Transported from OR on room air with adequate spontaneous ventilation    Post pain: adequate analgesia    Post assessment: no apparent anesthetic complications    Post vital signs: stable    Level of consciousness: awake    Nausea/Vomiting: no nausea/vomiting    Complications: none    Transfer of care protocol was followed      Last vitals:   HR 55  RR 16  T 36.7  SPO2 99  /69

## 2022-08-02 NOTE — ADDENDUM NOTE
Addendum  created 08/02/22 1244 by Saúl Almaraz MD    Attestation recorded in Intraprocedure, Intraprocedure Attestations filed

## 2022-08-02 NOTE — ANESTHESIA POSTPROCEDURE EVALUATION
Anesthesia Post Evaluation    Patient: Lisa Hilliard    Procedure(s) Performed: Procedure(s) (LRB):  PHACOEMULSIFICATION, CATARACT, WITH IOL INSERTION (Left)    Final Anesthesia Type: MAC      Patient location during evaluation: OPS  Patient participation: Yes- Able to Participate  Level of consciousness: awake  Post-procedure vital signs: reviewed and stable  Pain management: adequate  Airway patency: patent    PONV status at discharge: No PONV  Anesthetic complications: no      Cardiovascular status: blood pressure returned to baseline  Respiratory status: spontaneous ventilation  Hydration status: euvolemic  Follow-up not needed.          Vitals Value Taken Time   /81 08/02/22 1153   Temp 36.6 °C (97.9 °F) 08/02/22 1153   Pulse 56 08/02/22 1153   Resp 18 08/02/22 1153   SpO2 96 % 08/02/22 1153         No case tracking events are documented in the log.      Pain/Vale Score: Vale Score: 10 (8/2/2022 11:53 AM)

## 2022-08-02 NOTE — DISCHARGE SUMMARY
[facility]  Brief Discharge Note    Patient Name:  Lisa Hilliard   MRN:  [id number]    :  1950   Admission Date:  2022   Discharge Date:  2022   Attending Physician: Amarjit Valentine MD     Procedure:  PHACOEMULSIFICATION, CATARACT (Left)    OUTCOME: Patient tolerated procedure well without complication and is now ready for discharge.    DISPOSITION: Home or Self Care    FINAL DIAGNOSIS:  Age-related nuclear cataract, left eye    FOLLOWUP: 1 day in clinic    DISCHARGE INSTRUCTIONS:  Wear eye shield until your schedule appointment with doctor.                                                       Only remove eye shield to apply eye drops.                                                       Follow the post-op eye instructions given from the clinic.

## 2022-08-02 NOTE — OP NOTE
[facility]  Operative Note    Date:  2022    Patient:  Lisa Hilliard  MRN:  [id number]   :  1950    Surgeon:  Amarjit Valentine MD    PREOPERATIVE DIAGNOSIS:  Visually significant age-related nuclear cataract, left eye.    POSTOPERATIVE DIAGNOSIS:  Visually significant age-related nuclear cataract, left eye.    PROCEDURE:  Phacoemulsification of cataract with posterior chamber intraocular lens implant, left eye.    ANESTHESIA:  Topical with MAC.      COMPLICATIONS:  None.    ESTIMATED BLOOD LOSS:  Minimal.    PROCEDURE IN DETAIL:  The patient presented to our clinic complaining of increasing difficulties with activities of daily living due to a visually significant cataract in the left eye.  After risks, benefits, and alternatives were explained to the patient, the patient agreed to proceed with the above procedure.  The patient was brought to the operating room and received topical anesthetic to the left eye and was then prepped and draped in the usual sterile fashion.  The patient's pupil was observed through the operating microscope and noted be miotic, not allowing adequate visualization of the lens in the posterior chamber of the eye.  At this time, it was decided to use a Malyugin Ring during the procedure.  The left eye was first entered at 5:00 o'clock paracentesis site followed by intracameral lidocaine, and Viscoat.  The primary surgical site was then created at 2:30 o'clock followed by placement of Malyugin Ring, continuous capsulotomy, hydrodissection, and phacoemulsification of the cataract.  Residual cortical material was removed using the automated irrigation-aspiration technique.  Provisc was injected into the posterior chamber and an YARA model ZCB00 15.5 diopter lens was placed in the bag without difficulty.  Malyugin Ring was removed.  Residual Provisc was removed using the automated irrigation-aspiration technique.  The eye was re-pressurized using BSS solution, and both the  paracentesis and primary surgical site were demonstrated to be watertight at the end of the case with Weck-Beata manipulation.  Vigamox, Pred Forte, and Prolensa were placed in the eye followed by placement of a Holder shield.  The patient tolerated the procedure well and was taken to the recovery room in good and stable condition.  The patient was instructed to refrain from any heavy lifting, bending, stooping, or straining activities and to follow-up in the morning for routine post-operative care with Dr. Amarjit Valentine.

## 2022-09-28 ENCOUNTER — TELEPHONE (OUTPATIENT)
Dept: HEMATOLOGY/ONCOLOGY | Facility: CLINIC | Age: 72
End: 2022-09-28
Payer: MEDICARE

## 2022-11-14 ENCOUNTER — DOCUMENTATION ONLY (OUTPATIENT)
Dept: PRIMARY CARE CLINIC | Facility: CLINIC | Age: 72
End: 2022-11-14
Payer: MEDICARE

## 2023-07-03 LAB — BCS RECOMMENDATION EXT: NORMAL

## 2023-07-25 NOTE — PROGRESS NOTES
Subjective:       Patient ID: Lisa Hilliard is a 73 y.o. female.    Chief Complaint: Follow-up      History of Present Illness  PCP: Dr. Juma Lama- in [place], [place]  Psych: Dr. Rico Gonzalez, [place]     Problem List: Right Breast Cancer-RUQ IDC, [id number]--stage IA    Path:  Infiltrating ductal ca 0.7 cm. ER 98%, SD 91%, HER2 negative by IHC, HER 2 negtive by FISH, Ki 67--20%, Margin positive--lateral    Current Treatment: observation    Treatment History:   Radiation to right breast in [place]: 4800 cGy from 1/8/14 to 2/21/14 then 1000 cGy boost from 2/25/14 to 3/6/14.  Anastrazole 1 mg daily initiated 2/2014--Discontinued 08/03/17 s/t muscle cramps   Femara 2.5mg daily (initiated 09/07/17 stopped 4/4/19)    Onc History:  Right needle loc lumpectomy 11/13/2013 -Dr. Busby  Right re-excision 11/25/2013--NO residual cancer 0 of 4 nodes    HPI/Clinical History:  71 y/o F w/ PMHx of hypothyroidism, vit D deficiency, insomnia, depression, anxiety, HLD, OA and HTN follows in Oncology clinic for stage IA HR+ UQ IDC of right breast. She was found to have an abnormal mammogram and a biopsy was performed. Once the results return she was scheduled for a lumpectomy and sentinel node examination. Radiation was completed in [place] (3/6/14). After radiation was completed, anastrozole was initiated 2/2014. She took anastrozole for about 3 years, but had to discontinue it due to c/o muscle cramps. She was then started on Femara, which she is currently still taking.    Interval history  07/20/2023, acute concerns today.  She denies any new medications, ER or hospital visits.  She denies any new lumps or bumps.  She denies new foci of pain, decreased appetite or weight loss.    IMAGING:  10/2/17 DEXA Femur normal, L-spine borderline osteopenia at -1  5/11/18 MMG b/l benign  11/15/19 DEXA normal  6/24/21 MMG benign  6/30/22 MMG benign    Labs:  10/1/18 Vit D 24.6 Cr 0.8 TSH 7.45 AlkPhos 103  4/1/19 Vit D 25, WBC 10.7,  glucose 144  5/28/20 labs WNL  7/2/21 CBC and CMP unremarkable  7/1/22 Cr 0.7, AlkPhos 127, WBC 9.6, Hgb 13.7,     Past Medical History:   Diagnosis Date    Back problem     SPONDILOLTHESIS    Breast CA 11/2013    RIGHT    Cataract     History of radiation therapy     Hypertension        Past Surgical History:   Procedure Laterality Date    BREAST LUMPECTOMY Right 2013    CATARACT EXTRACTION Right 07/2022    EYE SURGERY  AUG/SEP 2022    CATARACTS    FRACTURE SURGERY  AUG 2011    ANKLE STILL CAUSES PAIN    GALLBLADDER SURGERY  10/2010    GASTRIC  09/2021    GASTRIC SLEEVE    TONSILLECTOMY         Family History   Problem Relation Age of Onset    Heart attack Mother     Heart disease Mother         HEART ATTACK    Liver disease Father     No Known Problems Sister     Alcohol abuse Brother         SOBER 20+ YRS    Hypertension Brother         NONE       Social History     Socioeconomic History    Marital status: Single   Tobacco Use    Smoking status: Never    Smokeless tobacco: Never   Substance and Sexual Activity    Alcohol use: Not Currently     Comment: NO ALCOHOL CONSUMPTION FOR 12+ YRS    Drug use: Never    Sexual activity: Not Currently     Birth control/protection: None     Comment: NOT SEXUALLY ACTIVE       Current Outpatient Medications   Medication Sig Dispense Refill    amitriptyline (ELAVIL) 100 MG tablet Take 100 mg by mouth every evening.      amLODIPine (NORVASC) 10 MG tablet Take 10 mg by mouth once daily.      atenoloL (TENORMIN) 50 MG tablet Take 50 mg by mouth once daily.      atorvastatin (LIPITOR) 20 MG tablet Take 20 mg by mouth once daily.      b complex vitamins tablet Take 1 tablet by mouth once daily.      clonazePAM (KLONOPIN) 0.5 MG tablet Take 0.5 mg by mouth every evening. PT TAKES  3  0.5MG TABS      cyanocobalamin 500 MCG tablet Take 1,000 mcg by mouth once daily. TAKES 2500 MCG      ergocalciferol (ERGOCALCIFEROL) 50,000 unit Cap Take 50,000 Units by mouth every 7 days.       multivitamin capsule Take 1 capsule by mouth once daily.      nystatin (MYCOSTATIN) powder Apply to affected area 3 times daily 30 g 2    sumatriptan (IMITREX) 50 MG tablet Take 50 mg by mouth daily as needed for Migraine.      traZODone (DESYREL) 100 MG tablet Take 100 mg by mouth every evening. PT TAKES  3  100MG TABS      vitamin D (VITAMIN D3) 1000 units Tab Take 1,000 Units by mouth once daily.      vitamin E 400 UNIT capsule Take 400 Units by mouth once daily.       No current facility-administered medications for this visit.     Facility-Administered Medications Ordered in Other Visits   Medication Dose Route Frequency Provider Last Rate Last Admin    LIDOcaine (PF) 10 mg/ml (1%) injection 10 mg  1 mL Intradermal Once Amarjit ZAKI Valentine MD        LIDOcaine (PF) 10 mg/ml (1%) injection 5 mg  0.5 mL Other Once Amarjit ZAKI Valentine MD           Review of patient's allergies indicates:   Allergen Reactions    Wellbutrin [bupropion hcl] Hallucinations    Anaprox [naproxen sodium] Hives and Nausea And Vomiting    Codeine Hives    Penicillins Hives    Percocet [oxycodone-acetaminophen] Hives    Percodan eliu Hives           Review of Systems  CONSTITUTIONAL: no fevers, no chills, + weight loss (s/p gastric sleeve), no fatigue, no weakness  HEMATOLOGIC: no abnormal bleeding, no abnormal bruising, no drenching night sweats  ONCOLOGIC: no new masses or lumps  HEENT: no vision loss, no tinnitus or hearing loss, no nose bleeding, no dysphagia, no odynophagia  CVS: no chest pain, no palpitations, no dyspnea on exertion  RESP: no shortness of breath, no hemoptysis, no cough  BREAST: no nipple discharge, no breast tenderness, no breast masses on self breast examination  GI: no nausea, no vomiting, no diarrhea, no constipation, no melena, no hematochezia, no hematemesis, no abdominal pain, no increase in abdominal girth  : no dysuria, no hematuria, no discharge  GYN: no abnormal vaginal bleeding, no dyspareunia, no vaginal  discharge  INTEGUMENT: no rashes, no abnormal bruising, no nail pitting, no hyperpigmentation  NEURO: no falls, no memory loss, no paresthesias or dysesthesias, no urofecal incontinence or retention, no loss of strength on any extremity  MSK: + chronic back pain, no new joint pain, no joint swelling  PSYCH: no suicidal or homicidal ideation, no depression, no insomnia, no anhedonia  ENDOCRINE: no heat or cold intolerance, no polyuria, no polydipsia        Objective:      Physical Exam  Vitals:    07/28/23 1109   BP: 130/78   Pulse: (!) 57   Resp: 18   Temp: 98.2 °F (36.8 °C)        ECOG PS 1  GA: AAOx3, NAD  HEENT: NCAT, PERRLA, EOMI, fair dentition, no oral ulcers  LYMPH: no cervical, axillary or supraclavicular adenopathy  CVS: s1s2 RRR, no M/R/G  RESP: CTA b/l, no crackles, no wheezes or rhonchi  ABD: soft, NT, ND, BS+, no hepatosplenomegaly  EXT: no deformities, no pedal edema  SKIN: red rash noted in skin fold left breast, no bruises or purpura, warm and dry, multiple seborrheic dermatosis on left breast and back  NEURO: normal mentation, strength 5/5 on all 4 extremities, no sensory deficits    Assessment:       Problem List Items Addressed This Visit          Oncology    Malignant neoplasm of upper-outer quadrant of right breast in female, estrogen receptor positive - Primary           Plan:       1. Malignant neoplasm of upper-outer quadrant of right female breast C50.411   RUQ IDC T1b stage IA, s/p lumpectomy initially with positive margins then with revision, then adjuvant EBRT and then adjuvant endocrine therapy (started 2/2014). Initially took anastrozole for ~3 years, then Femara, stopped 4/2019  MMG 6/2022, benign, repeat due in 1 year, ordered today  Nystatin powder to breast folds PRN redness/rash sent today  Mammogram in July 2023 BI-RADS 2    2. Osteopenia M85.80   Improved with prolia and now within normal limits. She is now off AI so no need for continuation of prolia  Last prolia 10/2019     3.  Morbid obesity E66.01   S/p gastric sleeve in 9/2022, has lost 50 lbs thus far        Plan   Patient will be discharged from our clinic, follow-up p.r.n.   She has close follow-up with her PCP and sees them every 3 months.  She would like for them to order her next mammogram.  Patient will continue annual mammogram screening with PCP.      A total of  20 minutes were spent in review of records, interpretation of test, coordination of care, discussion and counseling with the patient.        Portions of the record may have been created with voice recognition software. Occasional wrong-word or sound-a-like substitutions may have occurred due to the inherent limitations of voice recognition software. Read the chart carefully and recognize, using context, where substitutions have occurred.

## 2023-07-28 ENCOUNTER — OFFICE VISIT (OUTPATIENT)
Dept: HEMATOLOGY/ONCOLOGY | Facility: CLINIC | Age: 73
End: 2023-07-28
Payer: MEDICARE

## 2023-07-28 VITALS
HEART RATE: 57 BPM | TEMPERATURE: 98 F | SYSTOLIC BLOOD PRESSURE: 130 MMHG | HEIGHT: 64 IN | OXYGEN SATURATION: 95 % | WEIGHT: 214.88 LBS | BODY MASS INDEX: 36.69 KG/M2 | RESPIRATION RATE: 18 BRPM | DIASTOLIC BLOOD PRESSURE: 78 MMHG

## 2023-07-28 DIAGNOSIS — Z17.0 MALIGNANT NEOPLASM OF UPPER-OUTER QUADRANT OF RIGHT BREAST IN FEMALE, ESTROGEN RECEPTOR POSITIVE: Primary | ICD-10-CM

## 2023-07-28 DIAGNOSIS — C50.411 MALIGNANT NEOPLASM OF UPPER-OUTER QUADRANT OF RIGHT BREAST IN FEMALE, ESTROGEN RECEPTOR POSITIVE: Primary | ICD-10-CM

## 2023-07-28 PROCEDURE — 3008F PR BODY MASS INDEX (BMI) DOCUMENTED: ICD-10-PCS | Mod: CPTII,,, | Performed by: STUDENT IN AN ORGANIZED HEALTH CARE EDUCATION/TRAINING PROGRAM

## 2023-07-28 PROCEDURE — 3288F PR FALLS RISK ASSESSMENT DOCUMENTED: ICD-10-PCS | Mod: CPTII,,, | Performed by: STUDENT IN AN ORGANIZED HEALTH CARE EDUCATION/TRAINING PROGRAM

## 2023-07-28 PROCEDURE — 1159F MED LIST DOCD IN RCRD: CPT | Mod: CPTII,,, | Performed by: STUDENT IN AN ORGANIZED HEALTH CARE EDUCATION/TRAINING PROGRAM

## 2023-07-28 PROCEDURE — 99213 OFFICE O/P EST LOW 20 MIN: CPT | Mod: ,,, | Performed by: STUDENT IN AN ORGANIZED HEALTH CARE EDUCATION/TRAINING PROGRAM

## 2023-07-28 PROCEDURE — 99213 PR OFFICE/OUTPT VISIT, EST, LEVL III, 20-29 MIN: ICD-10-PCS | Mod: ,,, | Performed by: STUDENT IN AN ORGANIZED HEALTH CARE EDUCATION/TRAINING PROGRAM

## 2023-07-28 PROCEDURE — 3075F PR MOST RECENT SYSTOLIC BLOOD PRESS GE 130-139MM HG: ICD-10-PCS | Mod: CPTII,,, | Performed by: STUDENT IN AN ORGANIZED HEALTH CARE EDUCATION/TRAINING PROGRAM

## 2023-07-28 PROCEDURE — 3078F DIAST BP <80 MM HG: CPT | Mod: CPTII,,, | Performed by: STUDENT IN AN ORGANIZED HEALTH CARE EDUCATION/TRAINING PROGRAM

## 2023-07-28 PROCEDURE — 1159F PR MEDICATION LIST DOCUMENTED IN MEDICAL RECORD: ICD-10-PCS | Mod: CPTII,,, | Performed by: STUDENT IN AN ORGANIZED HEALTH CARE EDUCATION/TRAINING PROGRAM

## 2023-07-28 PROCEDURE — 3075F SYST BP GE 130 - 139MM HG: CPT | Mod: CPTII,,, | Performed by: STUDENT IN AN ORGANIZED HEALTH CARE EDUCATION/TRAINING PROGRAM

## 2023-07-28 PROCEDURE — 3288F FALL RISK ASSESSMENT DOCD: CPT | Mod: CPTII,,, | Performed by: STUDENT IN AN ORGANIZED HEALTH CARE EDUCATION/TRAINING PROGRAM

## 2023-07-28 PROCEDURE — 3078F PR MOST RECENT DIASTOLIC BLOOD PRESSURE < 80 MM HG: ICD-10-PCS | Mod: CPTII,,, | Performed by: STUDENT IN AN ORGANIZED HEALTH CARE EDUCATION/TRAINING PROGRAM

## 2023-07-28 PROCEDURE — 1126F PR PAIN SEVERITY QUANTIFIED, NO PAIN PRESENT: ICD-10-PCS | Mod: CPTII,,, | Performed by: STUDENT IN AN ORGANIZED HEALTH CARE EDUCATION/TRAINING PROGRAM

## 2023-07-28 PROCEDURE — 1101F PT FALLS ASSESS-DOCD LE1/YR: CPT | Mod: CPTII,,, | Performed by: STUDENT IN AN ORGANIZED HEALTH CARE EDUCATION/TRAINING PROGRAM

## 2023-07-28 PROCEDURE — 1126F AMNT PAIN NOTED NONE PRSNT: CPT | Mod: CPTII,,, | Performed by: STUDENT IN AN ORGANIZED HEALTH CARE EDUCATION/TRAINING PROGRAM

## 2023-07-28 PROCEDURE — 3008F BODY MASS INDEX DOCD: CPT | Mod: CPTII,,, | Performed by: STUDENT IN AN ORGANIZED HEALTH CARE EDUCATION/TRAINING PROGRAM

## 2023-07-28 PROCEDURE — 1101F PR PT FALLS ASSESS DOC 0-1 FALLS W/OUT INJ PAST YR: ICD-10-PCS | Mod: CPTII,,, | Performed by: STUDENT IN AN ORGANIZED HEALTH CARE EDUCATION/TRAINING PROGRAM

## 2023-10-02 ENCOUNTER — TELEPHONE (OUTPATIENT)
Dept: SURGERY | Facility: CLINIC | Age: 73
End: 2023-10-02
Payer: MEDICARE

## 2023-10-16 ENCOUNTER — TELEPHONE (OUTPATIENT)
Dept: SURGERY | Facility: CLINIC | Age: 73
End: 2023-10-16
Payer: MEDICARE

## 2024-05-10 NOTE — DISCHARGE INSTRUCTIONS
-RELAX AT HOME FOR THE REST OF THE DAY. YOU MAY EAT ANYTHING YOU LIKE.   -PLAN TO SEE DR HANNA TOMORROW AT THE OFFICE. BRING ALL EYE DROPS WITH YOU TO THIS APPOINTMENT.    -PUT EYE DROPS IN THE AFFECTED EYE AS PER DR HANNA'S EYE DROP SCHEDULED. -USE IN ANY ORDER, WAIT 5 MINUTES BETWEEN DROPS.  -REMOVE EYE SHIELD WHILE PUTTING EYE DROPS IN.    -DO NOT RUB YOUR EYE!!  -DO NOT EXERT YOURSELF - NO HEAVY LIFTING, RUNNING OR SWIMMING FOR 1 WEEK.  -YOU MAY SHOWER, BUT DON'T ALLOW WATER INTO YOUR EYE FOR 1 WEEK.  -WEAR PROTECTIVE SUNGLASSES DURING THE DAY AND AN EYE SHIELD AT NIGHT FOR 1 WEEK.    NO DRINKING ALCOHOL OR DRIVING FOR 24 HOURS.    -IF YOU HAVE PAIN, REDNESS OR DECREASED VISION, CALL DR HANNA'S OFFICE [phone] OR IF AFTER HOURS CALL [phone].    No

## 2024-08-02 ENCOUNTER — TELEPHONE (OUTPATIENT)
Dept: SURGERY | Facility: CLINIC | Age: 74
End: 2024-08-02

## 2025-09-05 ENCOUNTER — OFFICE VISIT (OUTPATIENT)
Dept: HEMATOLOGY/ONCOLOGY | Facility: CLINIC | Age: 75
End: 2025-09-05
Payer: MEDICARE

## 2025-09-05 VITALS
DIASTOLIC BLOOD PRESSURE: 69 MMHG | WEIGHT: 167 LBS | HEIGHT: 64 IN | BODY MASS INDEX: 28.51 KG/M2 | HEART RATE: 61 BPM | TEMPERATURE: 98 F | OXYGEN SATURATION: 99 % | SYSTOLIC BLOOD PRESSURE: 117 MMHG | RESPIRATION RATE: 14 BRPM

## 2025-09-05 DIAGNOSIS — Z17.0 MALIGNANT NEOPLASM OF OVERLAPPING SITES OF LEFT BREAST IN FEMALE, ESTROGEN RECEPTOR POSITIVE: Primary | ICD-10-CM

## 2025-09-05 DIAGNOSIS — C50.812 MALIGNANT NEOPLASM OF OVERLAPPING SITES OF LEFT BREAST IN FEMALE, ESTROGEN RECEPTOR POSITIVE: Primary | ICD-10-CM

## (undated) DEVICE — SOL IRRI STRL WATER 1000ML

## (undated) DEVICE — ELECTRODE FOAM 535 TEARDROP

## (undated) DEVICE — GLOVE BIOGEL ECLIPSE SZ 7.5

## (undated) DEVICE — CANNULA SUPERSOFT CO2 7FT

## (undated) DEVICE — GLOVE BIOGEL ECLIPSE SZ 6.5

## (undated) DEVICE — SYR 50CC LL

## (undated) DEVICE — SPONGE GAUZE 16PLY 4X4

## (undated) DEVICE — SHEILD EYE PROTCT BLUE FLEX

## (undated) DEVICE — UNDERGLOVES BIOGEL PI SZ 7 LF